# Patient Record
Sex: FEMALE | Race: WHITE | NOT HISPANIC OR LATINO | URBAN - METROPOLITAN AREA
[De-identification: names, ages, dates, MRNs, and addresses within clinical notes are randomized per-mention and may not be internally consistent; named-entity substitution may affect disease eponyms.]

---

## 2017-12-26 RX ORDER — NORETHINDRONE ACETATE AND ETHINYL ESTRADIOL AND FERROUS FUMARATE 1MG-20(24)
KIT ORAL
Qty: 28 TABLET | Refills: 10 | OUTPATIENT
Start: 2017-12-26

## 2017-12-29 RX ORDER — NORETHINDRONE ACETATE AND ETHINYL ESTRADIOL AND FERROUS FUMARATE 1MG-20(24)
KIT ORAL
Qty: 28 TABLET | Refills: 10 | OUTPATIENT
Start: 2017-12-29

## 2018-05-29 ENCOUNTER — OFFICE VISIT (OUTPATIENT)
Dept: OBSTETRICS AND GYNECOLOGY | Facility: CLINIC | Age: 23
End: 2018-05-29

## 2018-05-29 VITALS
BODY MASS INDEX: 25.27 KG/M2 | SYSTOLIC BLOOD PRESSURE: 110 MMHG | HEIGHT: 64 IN | DIASTOLIC BLOOD PRESSURE: 60 MMHG | WEIGHT: 148 LBS

## 2018-05-29 DIAGNOSIS — Z01.419 ENCOUNTER FOR WELL WOMAN EXAM WITH ROUTINE GYNECOLOGICAL EXAM: Primary | ICD-10-CM

## 2018-05-29 PROCEDURE — 99395 PREV VISIT EST AGE 18-39: CPT | Performed by: OBSTETRICS & GYNECOLOGY

## 2018-05-29 RX ORDER — NORETHINDRONE ACETATE AND ETHINYL ESTRADIOL AND FERROUS FUMARATE 1MG-20(24)
1 KIT ORAL DAILY
Qty: 28 TABLET | Refills: 12 | Status: SHIPPED | OUTPATIENT
Start: 2018-05-29 | End: 2019-05-29

## 2018-05-29 NOTE — PROGRESS NOTES
"Subjective   Chief Complaint   Patient presents with   • Annual Exam     Johanna Sawyer is a 23 y.o. year old  presenting to be seen for her annual exam.    Current birth control method: condoms. She has been off her birth control pills for couple months; i think she ran out and was overdue for appointment.She felt better on the birth control pills with lighter periods.  Also less fatigue and cramping.  She like to go back on these.  She had a question regarding a trip to [place] she is fluent [place].  She'll be there from September through December for school.  If her infant in [facility] has not okay at 3 month supply of Loestrin told her that they do self birth control pills over-the-counter in the pharmacias in [place].  She has questions regarding nutrition and weight.  Her BMI is 25.4.  Discussed diet and exercise.    Patient's last menstrual period was 2018.    She is sexually active.   Condoms ARE typically used.      Past 6 month menstrual history:    Cycle Frequency: regular, predictable and consistent every 28 - 32 days   Menstrual cycle character: flow is typically normal and flow is typically moderately heavy   Cycle Duration: 4 - 6   Number of heavy days of flows: 2   Intermenstrual bleeding present: {no   Post-coital bleeding present: no     She exercises regularly: yes.  Calcium intake: yes.  She performs self breast exam:not asked.  She has concerns about domestic violence: no.    The following portions of the patient's history were reviewed and updated as appropriate:problem list, current medications, allergies, past family history, past medical history, past social history and past surgical history.    Review of Systems    normal bladder and bowels  Objective   /60   Ht 162.6 cm (64\")   Wt 67.1 kg (148 lb)   LMP 2018   BMI 25.40 kg/m²      General:  well developed; well nourished  no acute distress  appears stated age   Skin:  No suspicious lesions seen   Thyroid:  "   Breasts:  Not performed.   Abdomen: soft, non-tender; no masses  no umbilical or inginual hernias are present  no hepato-splenomegaly   Pelvis: Clinical staff was present for exam  External genitalia:  normal appearance of the external genitalia including Bartholin's and [place]'s glands.  :  urethral meatus normal;  Vaginal:  normal pink mucosa without prolapse or lesions.  Cervix:  normal appearance. First Pap taken  Uterus:  normal size, shape and consistency. anteverted;  Adnexa:  normal bimanual exam of the adnexa.  Rectal:  digital rectal exam not performed; anus visually normal appearing.     Lab Review   No data reviewed    Imaging  No data reviewed       Assessment   1. Normal GYN examination.  Normal small nulliparous uterus anteflexed slightly no adnexal masses.  First Pap smear taken today we'll do STD screening as well.  Would encourage continued condom use even with reinstitution of birth control pills for birth control.     Plan   1. 1000 mg calcium in divided doses ideally in diet; regular exercise  2. As above      Medications Rx this encounter:  No orders of the defined types were placed in this encounter.         This note was electronically signed.    Jarrett Griffin MD  5/29/2018

## 2018-07-26 ENCOUNTER — TELEPHONE (OUTPATIENT)
Dept: OBSTETRICS AND GYNECOLOGY | Facility: CLINIC | Age: 23
End: 2018-07-26

## 2018-07-26 NOTE — TELEPHONE ENCOUNTER
Dr Gaby Sawyer the mother of pt states that daughter is in flight to [place] and left her birth control pills. Is there anything we can do.    Marie Sawyer phone [phone]

## 2018-08-31 ENCOUNTER — TELEPHONE (OUTPATIENT)
Dept: OBSTETRICS AND GYNECOLOGY | Facility: CLINIC | Age: 23
End: 2018-08-31

## 2018-08-31 RX ORDER — LEVONORGESTREL AND ETHINYL ESTRADIOL 0.15-0.03
1 KIT ORAL DAILY
Qty: 84 TABLET | Refills: 2 | Status: SHIPPED | OUTPATIENT
Start: 2018-08-31 | End: 2019-07-15 | Stop reason: SDUPTHER

## 2019-07-15 ENCOUNTER — OFFICE VISIT (OUTPATIENT)
Dept: OBSTETRICS AND GYNECOLOGY | Facility: CLINIC | Age: 24
End: 2019-07-15

## 2019-07-15 VITALS
WEIGHT: 154 LBS | DIASTOLIC BLOOD PRESSURE: 70 MMHG | HEIGHT: 65 IN | SYSTOLIC BLOOD PRESSURE: 116 MMHG | BODY MASS INDEX: 25.66 KG/M2

## 2019-07-15 DIAGNOSIS — Z01.419 ENCOUNTER FOR GYNECOLOGICAL EXAMINATION WITHOUT ABNORMAL FINDING: Primary | ICD-10-CM

## 2019-07-15 PROBLEM — Z78.9 STRICT VEGETARIAN DIET: Status: ACTIVE | Noted: 2019-07-15

## 2019-07-15 PROCEDURE — 99395 PREV VISIT EST AGE 18-39: CPT | Performed by: OBSTETRICS & GYNECOLOGY

## 2019-07-15 RX ORDER — LEVONORGESTREL AND ETHINYL ESTRADIOL 0.15-0.03
1 KIT ORAL DAILY
Qty: 84 TABLET | Refills: 3 | Status: SHIPPED | OUTPATIENT
Start: 2019-07-15 | End: 2020-05-26

## 2019-07-15 NOTE — PROGRESS NOTES
Subjective   Chief Complaint   Patient presents with   • Annual Exam     Johanna Sawyer is a 24 y.o. year old  presenting to be seen for her annual exam.    Current birth control method: OCP (estrogen/progesterone).  She had a question regarding IUDs.  Discussed that she would not want to use a copper IUD like her friend and throughout heavier cycles.  Being nulliparous I would suggest the Kyleena IUD if she chose to use that.  She does not have any real problems remembering to take her birth control pills.    Has some concerns for her mother who is obese and is at least prediabetic.  Talked her about more of a vegetarian type diet.  Discussed avoidance of carbohydrates which showed an excess in the American diet.    No LMP recorded (lmp unknown). Patient is not currently having periods (Reason: Oral contraceptives).    She is sexually active.   Condoms are not typically used.    [place] is painful or she is having problems :no  She has concerns about domestic violence: no.  She has received the Gardasil vaccination.    Cycle Frequency: regular every 90 days   Menstrual cycle character: flow is typically light   Cycle Duration: 2 - 3   Number of heavy days of flows: 0   Intermenstrual bleeding present: no   Post-coital bleeding present: no     She exercises regularly: yes.  Self breast awareness:not asked    Calcium intake: is adequate.3+   Caffeine intake: no or mild caffeine use  Social History    Tobacco Use      Smoking status: Never Smoker      Smokeless tobacco: Never Used    Social History     Substance and Sexual Activity   Alcohol Use Yes   • Frequency: 2-3 times a week   • Drinks per session: 1 or 2    Comment: social        The following portions of the patient's history were reviewed and updated as is  appropriate:She  has a past medical history of Asthma.  She does not have a problem list on file.  She  has a past surgical history that includes [place] tooth extraction.  Her family history  "is not on file.  She  reports that she has never smoked. She has never used smokeless tobacco. She reports that she drinks alcohol. She reports that she does not use drugs.  She has No Known Allergies..    Current Outpatient Medications:   •  Cetirizine HCl (ZYRTEC ALLERGY) 10 MG capsule, Zyrtec, Disp: , Rfl:   •  levonorgestrel-ethinyl estradiol (SEASONALE) 0.15-0.03 MG per tablet, Take 1 tablet by mouth Daily., Disp: 84 tablet, Rfl: 3    Review of Systems    normal bladder and bowels  Objective   /70   Ht 163.8 cm (64.5\")   Wt 69.9 kg (154 lb)   LMP  (LMP Unknown)   Breastfeeding? No   BMI 26.03 kg/m²       General:  well developed; well nourished  no acute distress  appears stated age   Skin:  No suspicious lesions seen   Thyroid:    Breasts:  Not performed.   Abdomen: soft, non-tender; no masses  no umbilical or inguinal hernias are present  no hepato-splenomegaly   Pelvis: Clinical staff was present for exam  External genitalia:  normal appearance of the external genitalia including Bartholin's and [place]'s glands.  :  urethral meatus normal;  Vaginal:  normal pink mucosa without prolapse or lesions.  Uterus:  normal size, shape and consistency. anteverted;  Adnexa:  normal bimanual exam of the adnexa.       Lab Review   Pap test    Imaging  No data reviewed       Assessment   1. Vegetarian  2. Normal gynecologic examination.  3. Status post Gardasil-I will ask my nurse to add this to her immunization profile               Plan     1. Continue birth control pills.  She can call if she would like some information regarding the Kyleena IUD  2. 1000 mg calcium in divided doses ideally in diet; regular exercise  3. Self breast awareness and mammogram protocols discussed.  4. Annual examination or sooner as needed         New Medications Ordered This Visit   Medications   • levonorgestrel-ethinyl estradiol (SEASONALE) 0.15-0.03 MG per tablet     Sig: Take 1 tablet by mouth Daily.     Dispense:  84 tablet "     Refill:  3     No orders of the defined types were placed in this encounter.          This note was electronically signed.    Jarrett Griffin MD  7/15/2019

## 2020-01-13 ENCOUNTER — TELEPHONE (OUTPATIENT)
Dept: OBSTETRICS AND GYNECOLOGY | Facility: CLINIC | Age: 25
End: 2020-01-13

## 2020-01-14 ENCOUNTER — TELEPHONE (OUTPATIENT)
Dept: OBSTETRICS AND GYNECOLOGY | Facility: CLINIC | Age: 25
End: 2020-01-14

## 2020-01-14 RX ORDER — NORETHINDRONE ACETATE AND ETHINYL ESTRADIOL AND FERROUS FUMARATE 1MG-20(24)
1 KIT ORAL DAILY
Qty: 28 TABLET | Refills: 6 | Status: SHIPPED | OUTPATIENT
Start: 2020-01-14 | End: 2020-05-26

## 2020-01-14 RX ORDER — NORETHINDRONE ACETATE AND ETHINYL ESTRADIOL AND FERROUS FUMARATE 1MG-20(24)
1 KIT ORAL DAILY
Qty: 90 TABLET | Refills: 1 | Status: SHIPPED | OUTPATIENT
Start: 2020-01-14 | End: 2020-04-13

## 2020-05-26 ENCOUNTER — OFFICE VISIT (OUTPATIENT)
Dept: OBSTETRICS AND GYNECOLOGY | Facility: CLINIC | Age: 25
End: 2020-05-26

## 2020-05-26 VITALS
BODY MASS INDEX: 23.66 KG/M2 | DIASTOLIC BLOOD PRESSURE: 70 MMHG | WEIGHT: 142 LBS | SYSTOLIC BLOOD PRESSURE: 116 MMHG | HEIGHT: 65 IN

## 2020-05-26 DIAGNOSIS — N76.0 ACUTE VAGINITIS: Primary | ICD-10-CM

## 2020-05-26 PROCEDURE — 83986 ASSAY PH BODY FLUID NOS: CPT | Performed by: OBSTETRICS & GYNECOLOGY

## 2020-05-26 PROCEDURE — 99213 OFFICE O/P EST LOW 20 MIN: CPT | Performed by: OBSTETRICS & GYNECOLOGY

## 2020-05-26 PROCEDURE — 87210 SMEAR WET MOUNT SALINE/INK: CPT | Performed by: OBSTETRICS & GYNECOLOGY

## 2020-05-26 RX ORDER — NORETHINDRONE ACETATE AND ETHINYL ESTRADIOL AND FERROUS FUMARATE 1MG-20(24)
KIT ORAL
COMMUNITY
End: 2020-07-16 | Stop reason: SDUPTHER

## 2020-05-26 RX ORDER — FLUCONAZOLE 150 MG/1
150 TABLET ORAL DAILY
Qty: 1 TABLET | Refills: 1 | Status: SHIPPED | OUTPATIENT
Start: 2020-05-26 | End: 2020-07-16

## 2020-05-26 NOTE — PROGRESS NOTES
Subjective   Chief Complaint   Patient presents with   • Annual Exam     Johanna Sawyer is a 24 y.o. year old  presenting to be seen for evaluation of an abnormal vaginal discharge.  Since about October she has been having what she says is little tears or maybe upon questioning seizures in the labia or and/or vagina.  Not really complain of a lot of vaginal discharge.  She is vegetarian no change in her diet no change in medications.  She thought it might get a little bit better when she switched birth control pills but she also admits that this could be just in her head.  Discussed that all birth control pills are low but progesterone dominant.  She do not have anything like this before.  Is more of a stinging kind of pain during intercourse.  Does not seem to get worse with urination.  Is been fairly constant does not come and go.  Does not get terribly worse    She is sexually active.  In the past 12 months there has been new sexual partners.  She is currently back together with her prior partner but sounds like there is another partner for short period of time.  Condoms ARE typically used.  She would like to be screened for STD's at today's exam.     Current birth control method: OCP (estrogen/progesterone)    Patient's last menstrual period was 2020.  Cycle Frequency: regular, predicable and consistent every 28 - 32 days   Menstrual cycle character: flow is typically normal   Cycle Duration: 3 - 5                 The following portions of the patient's history were reviewed and updated as appropriate:problem list, current medications and allergies    Current Outpatient Medications:   •  Cetirizine HCl (ZYRTEC ALLERGY) 10 MG capsule, Zyrtec, Disp: , Rfl:   •  norethindrone-ethinyl estradiol-ferrous fumarate (Yany 24 Fe) 1-20 MG-MCG(24) per tablet, Yany 24 Fe 1 mg-20 mcg (24)/75 mg (4) tablet, Disp: , Rfl:   •  fluconazole (Diflucan) 150 MG tablet, Take 1 tablet by mouth Daily. Take 1 tablet,  "Disp: 1 tablet, Rfl: 1     Review of Systems normal bowels and bladder no fevers no cough cold weight is been stable     Objective   /70   Ht 164.5 cm (64.75\")   Wt 64.4 kg (142 lb)   LMP 05/04/2020   Breastfeeding No   BMI 23.81 kg/m²     General:  well developed; well nourished  no acute distress  appears stated age   Skin:  No suspicious lesions seen   Abdomen: soft, non-tender; no masses  no umbilical or inguinal hernias are present  no hepato-splenomegaly   Pelvis: Clinical staff was present for exam  External genitalia:  normal appearance of the external genitalia including Bartholin's and [place]'s glands.  :  urethral meatus normal;  Vaginal:  normal pink mucosa without prolapse or lesions. discharge present -  white and thick; pH = 4.5 wet prep done: pseudo-hyphae are present; In the posterior fourchette there is a small area approximately 7 to 8 mm in diameter that is little more erythematous and is tender to touch  Cervix:  normal appearance. friable; Bled slightly when Q-tip used for STD screening  Uterus:  normal size, shape and consistency.  Adnexa:  normal bimanual exam of the adnexa.       Lab Review   Pap test and STD swabs for GC, chlamydia and trichimoniasis  Imaging   No data reviewed       Assessment   1. Yeast vaginitis likely cause of vaginal sensitivity/irritation  2. Problem cervix we will follow-up on STD screening     Plan   1. We will treat yeast vaginitis with Diflucan and over-the-counter Monistat.  2. Follow-up STD screening condoms for STD protection  3. Yearly in July; no change in birth control pills    No orders of the defined types were placed in this encounter.    New Medications Ordered This Visit   Medications   • fluconazole (Diflucan) 150 MG tablet     Sig: Take 1 tablet by mouth Daily. Take 1 tablet     Dispense:  1 tablet     Refill:  1         This note was electronically signed.    Jarrett Griffin MD  May 26, 2020    "

## 2020-05-27 ENCOUNTER — RESULTS ENCOUNTER (OUTPATIENT)
Dept: OBSTETRICS AND GYNECOLOGY | Facility: CLINIC | Age: 25
End: 2020-05-27

## 2020-05-27 DIAGNOSIS — N76.0 ACUTE VAGINITIS: ICD-10-CM

## 2020-06-22 ENCOUNTER — TELEPHONE (OUTPATIENT)
Dept: OBSTETRICS AND GYNECOLOGY | Facility: CLINIC | Age: 25
End: 2020-06-22

## 2020-07-16 ENCOUNTER — OFFICE VISIT (OUTPATIENT)
Dept: OBSTETRICS AND GYNECOLOGY | Facility: CLINIC | Age: 25
End: 2020-07-16

## 2020-07-16 VITALS
BODY MASS INDEX: 22.66 KG/M2 | HEIGHT: 65 IN | WEIGHT: 136 LBS | DIASTOLIC BLOOD PRESSURE: 70 MMHG | SYSTOLIC BLOOD PRESSURE: 114 MMHG

## 2020-07-16 DIAGNOSIS — R30.0 DYSURIA: ICD-10-CM

## 2020-07-16 DIAGNOSIS — Z01.419 ENCOUNTER FOR GYNECOLOGICAL EXAMINATION WITHOUT ABNORMAL FINDING: Primary | ICD-10-CM

## 2020-07-16 PROCEDURE — 99395 PREV VISIT EST AGE 18-39: CPT | Performed by: OBSTETRICS & GYNECOLOGY

## 2020-07-16 RX ORDER — NORETHINDRONE ACETATE AND ETHINYL ESTRADIOL AND FERROUS FUMARATE 1MG-20(24)
1 KIT ORAL DAILY
Qty: 28 TABLET | Refills: 12 | Status: SHIPPED | OUTPATIENT
Start: 2020-07-16 | End: 2021-08-02 | Stop reason: SDUPTHER

## 2020-07-16 RX ORDER — SULFAMETHOXAZOLE AND TRIMETHOPRIM 800; 160 MG/1; MG/1
TABLET ORAL
COMMUNITY
Start: 2020-07-12 | End: 2021-08-09

## 2020-07-16 NOTE — PROGRESS NOTES
Subjective   Chief Complaint   Patient presents with   • Annual Exam     Johanna Sawyer is a 25 y.o. year old  presenting to be seen for her annual exam.    Current birth control method: OCP (estrogen/progesterone).  She would like to continue her pills.  She just called in on the 12th elsewhere for Bactrim for bladder infection all day 4 of the pills out of 10 days feels much better.    Patient's last menstrual period was 2020.    She is sexually active.   Condoms are not typically used.    [place] is painful or she is having problems :no  She has concerns about domestic violence: no.    Cycle Frequency: regular, predictable and consistent every 28 - 32 days   Menstrual cycle character: flow is typically light and flow is typically normal   Cycle Duration: 3 - 4   Number of heavy days of flows: 0   Intermenstrual bleeding present: yes switched pills   Post-coital bleeding present: no     She exercises regularly: yes.  Self breast awareness:not asked    Calcium intake: is adequate.3  Caffeine intake: no or mild caffeine use  Social History    Tobacco Use      Smoking status: Never Smoker      Smokeless tobacco: Never Used    Social History     Substance and Sexual Activity   Alcohol Use Yes   • Frequency: 2-3 times a week   • Drinks per session: 1 or 2    Comment: social        The following portions of the patient's history were reviewed and updated as is  appropriate:problem list, current medications, allergies, past family history, past medical history, past social history and past surgical history.    Current Outpatient Medications:   •  Cetirizine HCl (ZYRTEC ALLERGY) 10 MG capsule, Zyrtec, Disp: , Rfl:   •  norethindrone-ethinyl estradiol-ferrous fumarate (Yany 24 Fe) 1-20 MG-MCG(24) per tablet, Take 1 tablet by mouth Daily., Disp: 28 tablet, Rfl: 12  •  sulfamethoxazole-trimethoprim (BACTRIM DS,SEPTRA DS) 800-160 MG per tablet, , Disp: , Rfl:     Review of systems  Constitutional    POS  "nothing reported                            NEG anorexia, fevers, malaise or night sweats  Breast                POS nothing reported                            NEG persistent breast lump, skin dimpling, breast tenderness or nipple discharge  GI                      POS nothing reported                            NEG bloating, change in bowel habits, melena or reflux symptoms                       POS nothing reported                            NEG dysuria or hematuria       Objective   /70   Ht 163.8 cm (64.5\")   Wt 61.7 kg (136 lb)   LMP 06/28/2020   Breastfeeding No   BMI 22.98 kg/m²       General:  well developed; well nourished  no acute distress  appears stated age   Skin:  No suspicious lesions seen   Thyroid:    Breasts:  Not performed.   Abdomen: soft, non-tender; no masses  no umbilical or inguinal hernias are present  no hepato-splenomegaly   Pelvis: Clinical staff was present for exam  External genitalia:  normal appearance of the external genitalia including Bartholin's and [place]'s glands.  :  urethral meatus normal; Bladder is nontender  Vaginal:  normal pink mucosa without prolapse or lesions.  Uterus:  normal size, shape and consistency.  Adnexa:  normal bimanual exam of the adnexa.  Rectal:  digital rectal exam not performed; anus visually normal appearing.       Lab Review   Pap test and STD screening    Imaging  No data reviewed       Assessment     1. Normal GYN examination doing well on birth control pills  2. Possible recent UTI improved on Bactrim  3. Strict vegan diet  4.              Plan     1. Annual examination or sooner as needed  2. 1000 mg calcium in divided doses ideally in diet; regular exercise  3. Condoms for STD protection  4.                New Medications Ordered This Visit   Medications   • norethindrone-ethinyl estradiol-ferrous fumarate (Yany 24 Fe) 1-20 MG-MCG(24) per tablet     Sig: Take 1 tablet by mouth Daily.     Dispense:  28 tablet     Refill:  12 "     No orders of the defined types were placed in this encounter.          This note was electronically signed.    Jarrett Griffni MD  7/16/2020

## 2020-12-31 ENCOUNTER — TELEPHONE (OUTPATIENT)
Dept: OBSTETRICS AND GYNECOLOGY | Facility: CLINIC | Age: 25
End: 2020-12-31

## 2020-12-31 NOTE — TELEPHONE ENCOUNTER
PT STATES SECOND WEEK OF BIRTH CONTROL AND IS BLEEDING - DID TAKE ANTIBIOTIC W/BACK UP BIRTH CONTROL - COULD THIS BE THE CAUSE

## 2020-12-31 NOTE — TELEPHONE ENCOUNTER
It could be the antibiotics it could be associate with generic birth control pills.  I would stay on these if this is the first time she has had any problems since we saw her in July.  If this persists next month we will switch her birth control pills.  Thank you

## 2021-08-02 ENCOUNTER — TELEPHONE (OUTPATIENT)
Dept: OBSTETRICS AND GYNECOLOGY | Facility: CLINIC | Age: 26
End: 2021-08-02

## 2021-08-02 RX ORDER — NORETHINDRONE ACETATE AND ETHINYL ESTRADIOL, AND FERROUS FUMARATE 1MG-20(24)
1 KIT ORAL DAILY
Qty: 28 TABLET | Refills: 0 | Status: SHIPPED | OUTPATIENT
Start: 2021-08-02 | End: 2021-08-09 | Stop reason: ALTCHOICE

## 2021-08-02 NOTE — TELEPHONE ENCOUNTER
Patient called and wanted to know if a refill for her birth control could be sent to her pharmacy.  She has an appointment scheduled for 8/9/21 with Whitney.

## 2021-08-02 NOTE — TELEPHONE ENCOUNTER
Patient called and was wanting to know if we were able to send in the refill for her birth control.  I advised her that the refill had been sent.  Patient verbalized understanding and she had no questions at this time.

## 2021-08-09 ENCOUNTER — OFFICE VISIT (OUTPATIENT)
Dept: OBSTETRICS AND GYNECOLOGY | Facility: CLINIC | Age: 26
End: 2021-08-09

## 2021-08-09 VITALS
BODY MASS INDEX: 24 KG/M2 | WEIGHT: 142 LBS | SYSTOLIC BLOOD PRESSURE: 110 MMHG | DIASTOLIC BLOOD PRESSURE: 70 MMHG | RESPIRATION RATE: 16 BRPM

## 2021-08-09 DIAGNOSIS — Z01.419 ENCOUNTER FOR WELL WOMAN EXAM WITH ROUTINE GYNECOLOGICAL EXAM: Primary | ICD-10-CM

## 2021-08-09 DIAGNOSIS — Z30.41 ENCOUNTER FOR SURVEILLANCE OF CONTRACEPTIVE PILLS: ICD-10-CM

## 2021-08-09 PROCEDURE — 99395 PREV VISIT EST AGE 18-39: CPT | Performed by: NURSE PRACTITIONER

## 2021-08-09 RX ORDER — AMOXICILLIN 875 MG/1
TABLET, COATED ORAL
COMMUNITY
Start: 2021-08-02 | End: 2021-12-07

## 2021-08-09 RX ORDER — NEOMYCIN SULFATE, POLYMYXIN B SULFATE, HYDROCORTISONE 3.5; 10000; 1 MG/ML; [USP'U]/ML; MG/ML
SOLUTION/ DROPS AURICULAR (OTIC)
COMMUNITY
Start: 2021-08-02 | End: 2021-12-07

## 2021-08-09 NOTE — PROGRESS NOTES
Annual Visit     Patient Name: Johanna Sawyer  : 1995   MRN: [id number]   Care Team: Patient Care Team:  Provider, No Known as PCP - General    Chief Complaint:    Chief Complaint   Patient presents with   • Annual Exam       HPI: Johanna Sawyer is a 26 y.o. year old  presenting to be seen for her gynecologic exam.   Doing well with Yany 24 Fe but would like to discuss other options that allow amenorrhea   Light and short periods currently - avg 3 days   No BTB with current pill   Sexually active - requests STD screening today - no s/sx just wants to be sure   Pap smear  WNL, no tzone present   She is a          Subjective      /70   Resp 16   Wt 64.4 kg (142 lb)   LMP 2021   Breastfeeding No   BMI 24.00 kg/m²     BMI reviewed: Body mass index is 24 kg/m².      Objective     Physical Exam    Neuro: alert and oriented to person, place and time   General:  alert; cooperative; well developed; well nourished   Skin:  No suspicious lesions seen   Thyroid: normal to inspection and palpation   Lungs:  breathing is unlabored  clear to auscultation bilaterally   Heart:  regular rate and rhythm, S1, S2 normal, no murmur, click, rub or gallop  normal apical impulse   Breasts:  Examined in supine position  Symmetric without masses or skin dimpling  Nipples normal without inversion, lesions or discharge  There are no palpable axillary nodes  Fibrocystic changes are present both breasts without a discrete mass   Abdomen: soft, non-tender; no masses  no umbilical or inguinal hernias are present  no hepato-splenomegaly   Pelvis: Clinical staff was present for exam  External genitalia:  normal appearance of the external genitalia including Bartholin's and [place]'s glands.  :  urethral meatus normal;  Vaginal:  normal pink mucosa without prolapse or lesions.  Cervix:  normal appearance.  Uterus:  normal size, shape and consistency.  Adnexa:  normal bimanual exam of the  adnexa.  Rectal:  digital rectal exam not performed; anus visually normal appearing.         Assessment / Plan      Assessment  Problems Addressed This Visit    ICD-10-CM ICD-9-CM   1. Encounter for well woman exam with routine gynecological exam  Z01.419 V72.31   2. Encounter for surveillance of contraceptive pills  Z30.41 V25.41       Plan    Pap smear pending   Discussed monthly SBEs   Discussed Lo loestrin and rate of amenorrhea with method - samples x 2 given   She will finish current pill pkg, then start sample and call before she completes the pkg to f/u - we can cont with lo loestrin or if she has BTB, go back to [place] 24 fe and do continuous dosing   No C/Is to cont COCs         19 to 39: Counseling/Anticipatory Guidance Discussed: family planning/contraception, sexual behavior and STDs and breast cancer and self breast exams    Follow Up  Return in about 1 year (around 8/9/2022) for Annual physical.  Patient was given instructions and counseling regarding her condition or for health maintenance advice. Please see specific information pulled into the AVS if appropriate.     Whitney Delgadillo, APRN  August 9, 2021  15:32 EDT

## 2021-10-19 ENCOUNTER — TELEPHONE (OUTPATIENT)
Dept: OBSTETRICS AND GYNECOLOGY | Facility: CLINIC | Age: 26
End: 2021-10-19

## 2021-10-19 NOTE — TELEPHONE ENCOUNTER
Pt is calling [facility] is calling to see if we can call a prescription in of this

## 2021-12-07 PROCEDURE — U0004 COV-19 TEST NON-CDC HGH THRU: HCPCS | Performed by: NURSE PRACTITIONER

## 2021-12-07 PROCEDURE — 87081 CULTURE SCREEN ONLY: CPT | Performed by: NURSE PRACTITIONER

## 2022-01-11 ENCOUNTER — TELEPHONE (OUTPATIENT)
Dept: OBSTETRICS AND GYNECOLOGY | Facility: CLINIC | Age: 27
End: 2022-01-11

## 2022-01-11 RX ORDER — NORETHINDRONE ACETATE AND ETHINYL ESTRADIOL AND FERROUS FUMARATE 1MG-20(24)
1 KIT ORAL DAILY
Qty: 84 TABLET | Refills: 5 | OUTPATIENT
Start: 2022-01-11 | End: 2022-08-29

## 2022-11-11 ENCOUNTER — OFFICE VISIT (OUTPATIENT)
Dept: OBSTETRICS AND GYNECOLOGY | Facility: CLINIC | Age: 27
End: 2022-11-11

## 2022-11-11 VITALS
DIASTOLIC BLOOD PRESSURE: 70 MMHG | BODY MASS INDEX: 24.79 KG/M2 | RESPIRATION RATE: 16 BRPM | WEIGHT: 149 LBS | SYSTOLIC BLOOD PRESSURE: 114 MMHG

## 2022-11-11 DIAGNOSIS — Z30.41 ENCOUNTER FOR SURVEILLANCE OF CONTRACEPTIVE PILLS: ICD-10-CM

## 2022-11-11 DIAGNOSIS — Z01.419 ENCOUNTER FOR GYNECOLOGICAL EXAMINATION WITHOUT ABNORMAL FINDING: Primary | ICD-10-CM

## 2022-11-11 PROCEDURE — 99395 PREV VISIT EST AGE 18-39: CPT | Performed by: NURSE PRACTITIONER

## 2022-11-11 RX ORDER — NORETHINDRONE ACETATE AND ETHINYL ESTRADIOL, AND FERROUS FUMARATE 1MG-20(24)
1 KIT ORAL DAILY
Qty: 84 TABLET | Refills: 4 | Status: SHIPPED | OUTPATIENT
Start: 2022-11-11 | End: 2023-03-20 | Stop reason: SDUPTHER

## 2022-11-11 RX ORDER — NORETHINDRONE ACETATE AND ETHINYL ESTRADIOL, AND FERROUS FUMARATE 1MG-20(24)
KIT ORAL
COMMUNITY
Start: 2022-10-19 | End: 2022-11-11 | Stop reason: SDUPTHER

## 2022-11-11 NOTE — PROGRESS NOTES
Annual Visit     Patient Name: Johanna Sawyer  : 1995   MRN: [id number]   Care Team: Patient Care Team:  Provider, No Known as PCP - General    Chief Complaint:    Chief Complaint   Patient presents with   • Annual Exam       HPI: Johanna Sawyer is a 27 y.o. year old  presenting to be seen for her gynecologic exam.   Doing well with Yany 24 Fe  Tried lo loestrin last yr to achieve amenorrhea but d/t cost, changed back to Yany 24 Fe   Taking continuously and having withdrawal bldg q 3 months - light and short flow, no BTB     Pap smear 2021 WNL     She is a        Subjective      I have reviewed the patients family history, social history, past medical history, past surgical history and have updated it as appropriate.    /70   Resp 16   Wt 67.6 kg (149 lb)   LMP 10/13/2022   BMI 24.79 kg/m²     BMI reviewed: Body mass index is 24.79 kg/m².      Objective     Physical Exam    Neuro: alert and oriented to person, place and time   General:  alert; cooperative; well developed; well nourished   Skin:  No suspicious lesions seen   Thyroid: normal to inspection and palpation   Lungs:  breathing is unlabored  clear to auscultation bilaterally   Heart:  regular rate and rhythm, S1, S2 normal, no murmur, click, rub or gallop  normal apical impulse   Breasts:  Examined in supine position  Symmetric without masses or skin dimpling  Nipples normal without inversion, lesions or discharge  There are no palpable axillary nodes  Fibrocystic changes are present both breasts without a discrete mass   Abdomen: soft, non-tender; no masses  no umbilical or inguinal hernias are present  no hepato-splenomegaly   Pelvis: Clinical staff was present for exam  External genitalia:  normal appearance of the external genitalia including Bartholin's and [place]'s glands.  :  urethral meatus normal;  Vaginal:  normal pink mucosa without prolapse or lesions.  Cervix:  normal appearance.  Uterus:   normal size, shape and consistency.  Adnexa:  normal bimanual exam of the adnexa.  Rectal:  digital rectal exam not performed; anus visually normal appearing.         Assessment / Plan      Assessment  Problems Addressed This Visit    ICD-10-CM ICD-9-CM   1. Encounter for gynecological examination without abnormal finding  Z01.419 V72.31   2. Encounter for surveillance of contraceptive pills  Z30.41 V25.41       Plan    Pap smear not indicated     Discussed monthly SBEs and fibrocystic breast changes     No C/Is to cont COCs   Will cont continuously with withdrawal bldg q 3 months   Yany Fe 24 refills to pharmacy   Discussed condom use for STI prevention     AV 1 yr         19 to 39: Counseling/Anticipatory Guidance Discussed: family planning/contraception, sexual behavior and STDs and breast cancer and self breast exams    Follow Up  Return in about 1 year (around 11/11/2023) for Annual physical.  Patient was given instructions and counseling regarding her condition or for health maintenance advice. Please see specific information pulled into the AVS if appropriate.     Whitney Delgadillo, SANDEE  November 11, 2022  08:31 EST

## 2023-03-20 ENCOUNTER — TELEPHONE (OUTPATIENT)
Dept: OBSTETRICS AND GYNECOLOGY | Facility: CLINIC | Age: 28
End: 2023-03-20
Payer: COMMERCIAL

## 2023-03-20 RX ORDER — NORETHINDRONE ACETATE AND ETHINYL ESTRADIOL, AND FERROUS FUMARATE 1MG-20(24)
1 KIT ORAL DAILY
Qty: 84 TABLET | Refills: 2 | Status: SHIPPED | OUTPATIENT
Start: 2023-03-20

## 2023-03-20 NOTE — TELEPHONE ENCOUNTER
BC called in to hold her over to next due annual, Called attempting to reach patient to inform her of this; no answer, LVM requesting call back and provided office call back number.    HUB/:  Okay to inform her that Rx was sent, please update here and reroute to clinical pool to inform us of update if you let her know, thank you.

## 2023-03-20 NOTE — TELEPHONE ENCOUNTER
Caller: Johanna Sawyer    Relationship: Self    Best call back number: [phone]  What medications are you currently taking:   Current Outpatient Medications on File Prior to Visit   Medication Sig Dispense Refill   • Cetirizine HCl 10 MG capsule Zyrtec     • Yany 24 Fe 1-20 MG-MCG(24) per tablet Take 1 tablet by mouth Daily. To be taken continuously. 84 tablet 4   • [DISCONTINUED] fluticasone (FLONASE) 50 MCG/ACT nasal spray 2 sprays into the nostril(s) as directed by provider Every Night for 30 days. Administer 2 sprays in each nostril for each dose. 18.2 mL 0     No current facility-administered medications on file prior to visit.          When did you start taking these medications: NA    Which medication are you concerned about: Yany 24 Fe 1-20 MG-MCG(24) per tablet [[id number]] (Order [id number])        Who prescribed you this medication: SANDEE VALENZUELA     What are your concerns: SHE HAS CONCERNS, SHE IS OUT REFILLS AND THE DAYS ARE NOW OFF TRACK   How long have you had these concerns: NA    OUT OF THIS MEDICATION, REQUESTING REFILLS AS WELL, OK TO CALLBACK ANYTIME, OK TO LEAVE A .         PHARMACY CONFIRMED

## 2023-06-12 ENCOUNTER — TELEMEDICINE (OUTPATIENT)
Dept: FAMILY MEDICINE CLINIC | Facility: TELEHEALTH | Age: 28
End: 2023-06-12

## 2023-06-12 DIAGNOSIS — J30.2 SEASONAL ALLERGIES: ICD-10-CM

## 2023-06-12 DIAGNOSIS — R09.81 NASAL SINUS CONGESTION: Primary | ICD-10-CM

## 2023-06-12 RX ORDER — AZELASTINE 1 MG/ML
2 SPRAY, METERED NASAL 2 TIMES DAILY
Qty: 30 ML | Refills: 0 | Status: SHIPPED | OUTPATIENT
Start: 2023-06-12

## 2023-06-12 RX ORDER — PSEUDOEPHEDRINE HCL 120 MG/1
120 TABLET, FILM COATED, EXTENDED RELEASE ORAL EVERY 12 HOURS
Qty: 20 TABLET | Refills: 0 | Status: SHIPPED | OUTPATIENT
Start: 2023-06-12

## 2023-06-12 NOTE — PROGRESS NOTES
You have chosen to receive care through a telehealth visit.  Do you consent to use a video/audio connection for your medical care today? Yes     HPI  Johanna Sawyer is a 28 y.o. female  presents with complaint of 4 day h/o nasal congestion, sinus pressure and tenderness going into the teeth area and yellow nasal discharge. She is using OTC cold and sinus medication, flonase and Benadryl at hs. She reports no fever.     Review of Systems   Constitutional:  Negative for activity change, fatigue and fever.   HENT:  Positive for congestion, rhinorrhea, sinus pressure and sinus pain.    Respiratory: Negative.     Cardiovascular: Negative.    Gastrointestinal: Negative.    Allergic/Immunologic: Positive for environmental allergies.   Neurological: Negative.    Hematological: Negative.    Psychiatric/Behavioral: Negative.       History reviewed. No pertinent past medical history.    Family History   Problem Relation Age of Onset    Hypothyroidism Mother     Obesity Mother         Apple shaped     Diabetes type II Maternal Grandmother        Social History     Socioeconomic History    Marital status: Single   Tobacco Use    Smoking status: Never    Smokeless tobacco: Never   Vaping Use    Vaping Use: Never used   Substance and Sexual Activity    Alcohol use: Yes     Comment: social    Drug use: No    Sexual activity: Yes     Partners: Male     Birth control/protection: OCP         There were no vitals taken for this visit.    PHYSICAL EXAM  Physical Exam   Constitutional: She appears well-developed and well-nourished. She does not have a sickly appearance. She does not appear ill. No distress.   HENT:   Head: Normocephalic and atraumatic.   Nose: Rhinorrhea and congestion present. Right sinus exhibits maxillary sinus tenderness and frontal sinus tenderness. Left sinus exhibits maxillary sinus tenderness and frontal sinus tenderness.   Pulmonary/Chest: Effort normal.   Neurological: She is alert.   Psychiatric: She has a  normal mood and affect.   Vitals reviewed.    Diagnoses and all orders for this visit:    1. Nasal sinus congestion (Primary)  -     pseudoephedrine (Sudafed 12 Hour) 120 MG 12 hr tablet; Take 1 tablet by mouth Every 12 (Twelve) Hours.  Dispense: 20 tablet; Refill: 0  -     Ambulatory Referral to Family Practice    2. Seasonal allergies  -     azelastine (ASTELIN) 0.1 % nasal spray; 2 sprays into the nostril(s) as directed by provider 2 (Two) Times a Day. Use in each nostril as directed  Dispense: 30 mL; Refill: 0    Supporitive treatment with increase fluids and rest.   Continue Zyrtec and add Astelin nasal spray.   If no improvement follow up with PCP/virtual care.   PCP referral pending.       FOLLOW-UP  As discussed during visit with Virtual Care, if symptoms worsen or fail to improve, follow-up with PCP/Urgent Care/Emergency Department.    Patient verbalizes understanding of medications, instructions for treatment and follow-up.    Susi Lopez, SANDEE  06/12/2023  10:04 EDT    The use of a video visit has been reviewed with the patient and verbal informed consent has been obtained. Myself and Johanna Sawyer participated in this visit. The patient is located in  [facility], and I am located in [place], [place]. MyChart and Zoom were utilized.

## 2023-08-13 ENCOUNTER — TELEMEDICINE (OUTPATIENT)
Dept: FAMILY MEDICINE CLINIC | Facility: TELEHEALTH | Age: 28
End: 2023-08-13

## 2023-08-13 DIAGNOSIS — R39.89 SUSPECTED UTI: Primary | ICD-10-CM

## 2023-08-13 RX ORDER — NITROFURANTOIN 25; 75 MG/1; MG/1
100 CAPSULE ORAL 2 TIMES DAILY
Qty: 14 CAPSULE | Refills: 0 | Status: SHIPPED | OUTPATIENT
Start: 2023-08-13 | End: 2023-08-20

## 2023-08-13 RX ORDER — PHENAZOPYRIDINE HYDROCHLORIDE 200 MG/1
200 TABLET, FILM COATED ORAL 3 TIMES DAILY PRN
Qty: 6 TABLET | Refills: 0 | Status: SHIPPED | OUTPATIENT
Start: 2023-08-13 | End: 2023-08-15

## 2023-08-13 RX ORDER — FLUTICASONE PROPIONATE 50 MCG
SPRAY, SUSPENSION (ML) NASAL
COMMUNITY

## 2023-08-13 NOTE — PATIENT INSTRUCTIONS
Urinary Tract Infection, Adult    A urinary tract infection (UTI) is an infection of any part of the urinary tract. The urinary tract includes the kidneys, ureters, bladder, and urethra. These organs make, store, and get rid of urine in the body.  An upper UTI affects the ureters and kidneys. A lower UTI affects the bladder and urethra.  What are the causes?  Most urinary tract infections are caused by bacteria in your genital area around your urethra, where urine leaves your body. These bacteria grow and cause inflammation of your urinary tract.  What increases the risk?  You are more likely to develop this condition if:  You have a urinary catheter that stays in place.  You are not able to control when you urinate or have a bowel movement (incontinence).  You are female and you:  Use a spermicide or diaphragm for birth control.  Have low estrogen levels.  Are pregnant.  You have certain genes that increase your risk.  You are sexually active.  You take antibiotic medicines.  You have a condition that causes your flow of urine to slow down, such as:  An enlarged prostate, if you are male.  Blockage in your urethra.  A kidney stone.  A nerve condition that affects your bladder control (neurogenic bladder).  Not getting enough to drink, or not urinating often.  You have certain medical conditions, such as:  Diabetes.  A weak disease-fighting system (immunesystem).  Sickle cell disease.  Gout.  Spinal cord injury.  What are the signs or symptoms?  Symptoms of this condition include:  Needing to urinate right away (urgency).  Frequent urination. This may include small amounts of urine each time you urinate.  Pain or burning with urination.  Blood in the urine.  Urine that smells bad or unusual.  Trouble urinating.  Cloudy urine.  Vaginal discharge, if you are female.  Pain in the abdomen or the lower back.  You may also have:  Vomiting or a decreased appetite.  Confusion.  Irritability or tiredness.  A fever or  chills.  Diarrhea.  The first symptom in older adults may be confusion. In some cases, they may not have any symptoms until the infection has worsened.  How is this diagnosed?  This condition is diagnosed based on your medical history and a physical exam. You may also have other tests, including:  Urine tests.  Blood tests.  Tests for STIs (sexually transmitted infections).  If you have had more than one UTI, a cystoscopy or imaging studies may be done to determine the cause of the infections.  How is this treated?  Treatment for this condition includes:  Antibiotic medicine.  Over-the-counter medicines to treat discomfort.  Drinking enough water to stay hydrated.  If you have frequent infections or have other conditions such as a kidney stone, you may need to see a health care provider who specializes in the urinary tract (urologist).  In rare cases, urinary tract infections can cause sepsis. Sepsis is a life-threatening condition that occurs when the body responds to an infection. Sepsis is treated in the hospital with IV antibiotics, fluids, and other medicines.  Follow these instructions at home:    Medicines  Take over-the-counter and prescription medicines only as told by your health care provider.  If you were prescribed an antibiotic medicine, take it as told by your health care provider. Do not stop using the antibiotic even if you start to feel better.  General instructions  Make sure you:  Empty your bladder often and completely. Do not hold urine for long periods of time.  Empty your bladder after sex.  Wipe from front to back after urinating or having a bowel movement if you are female. Use each tissue only one time when you wipe.  Drink enough fluid to keep your urine pale yellow.  Keep all follow-up visits. This is important.  Contact a health care provider if:  Your symptoms do not get better after 1-2 days.  Your symptoms go away and then return.  Get help right away if:  You have severe pain in  your back or your lower abdomen.  You have a fever or chills.  You have nausea or vomiting.  Summary  A urinary tract infection (UTI) is an infection of any part of the urinary tract, which includes the kidneys, ureters, bladder, and urethra.  Most urinary tract infections are caused by bacteria in your genital area.  Treatment for this condition often includes antibiotic medicines.  If you were prescribed an antibiotic medicine, take it as told by your health care provider. Do not stop using the antibiotic even if you start to feel better.  Keep all follow-up visits. This is important.  This information is not intended to replace advice given to you by your health care provider. Make sure you discuss any questions you have with your health care provider.  Document Revised: 07/30/2021 Document Reviewed: 07/30/2021  [employer] Patient Education c 2023 [employer]

## 2023-08-13 NOTE — PROGRESS NOTES
You have chosen to receive care through a telehealth visit.  Do you consent to use a video/audio connection for your medical care today? Yes     CHIEF COMPLAINT  No chief complaint on file.        HPI  Johanna Sawyer is a 28 y.o. female  presents with complaint of woke up this morning with dysuria, frequency, urgency, bladder discomfort, small amt of blood in urine.  Denies fever/chills, n/v, back pain, or vaginal symptoms at this time.     Review of Systems  See HPI    No past medical history on file.    Family History   Problem Relation Age of Onset    Hypothyroidism Mother     Obesity Mother         Apple shaped     Diabetes type II Maternal Grandmother        Social History     Socioeconomic History    Marital status: Single   Tobacco Use    Smoking status: Never    Smokeless tobacco: Never   Vaping Use    Vaping Use: Never used   Substance and Sexual Activity    Alcohol use: Yes     Comment: social    Drug use: No    Sexual activity: Yes     Partners: Male     Birth control/protection: OCP       Johanna Sawyer  reports that she has never smoked. She has never used smokeless tobacco..               There were no vitals taken for this visit.    PHYSICAL EXAM  Physical Exam   Constitutional: She is oriented to person, place, and time. She appears well-developed and well-nourished. She does not have a sickly appearance. She does not appear ill.   HENT:   Head: Normocephalic and atraumatic.   Pulmonary/Chest: Effort normal.  No respiratory distress.  Neurological: She is alert and oriented to person, place, and time.         Diagnoses and all orders for this visit:    1. Suspected UTI (Primary)  -     nitrofurantoin, macrocrystal-monohydrate, (MACROBID) 100 MG capsule; Take 1 capsule by mouth 2 (Two) Times a Day for 7 days.  Dispense: 14 capsule; Refill: 0  -     phenazopyridine (PYRIDIUM) 200 MG tablet; Take 1 tablet by mouth 3 (Three) Times a Day As Needed for Bladder Spasms for up to 2 days.  Dispense: 6  tablet; Refill: 0    --take medications as prescribed  --increase fluids, rest as needed, tylenol or ibuprofen for pain  --f/u in 2-3 days if no improvement        FOLLOW-UP  As discussed during visit with PCP/[facility] Care if no improvement or Urgent Care/Emergency Department if worsening of symptoms    Patient verbalizes understanding of medication dosage, comfort measures, instructions for treatment and follow-up.    Sofy Grimes, APRN  08/13/2023  17:50 EDT    The use of a video visit has been reviewed with the patient and verbal informed consent has been obtained. Myself and Johanna Sawyer participated in this visit. The patient is located in [address] [place] [address].    I am located in [place], [place]. Playtox and Fastr were utilized. I spent 8 minutes in the patient's chart for this visit.

## 2023-11-22 ENCOUNTER — TELEMEDICINE (OUTPATIENT)
Dept: FAMILY MEDICINE CLINIC | Facility: TELEHEALTH | Age: 28
End: 2023-11-22

## 2023-11-22 DIAGNOSIS — H60.392 OTHER INFECTIVE ACUTE OTITIS EXTERNA OF LEFT EAR: Primary | ICD-10-CM

## 2023-11-22 PROCEDURE — 99213 OFFICE O/P EST LOW 20 MIN: CPT | Performed by: NURSE PRACTITIONER

## 2023-11-22 NOTE — PATIENT INSTRUCTIONS
Over the counter pain relievers okay   If symptoms do not improve in 3-5 days follow up with your primary care provider or urgent care         Otitis Externa  Otitis externa is an infection of the outer ear canal. The outer ear canal is the area between the outside of the ear and the eardrum. Otitis externa is sometimes called swimmer's ear.  What are the causes?  Common causes of this condition include:  Swimming in dirty water.  Moisture in the ear.  An injury to the inside of the ear.  An object stuck in the ear.  A cut or scrape on the outside of the ear or in the ear canal.  What increases the risk?  You are more likely to get this condition if you go swimming often.  What are the signs or symptoms?  Itching in the ear. This is often the first symptom.  Swelling of the ear.  Redness in the ear.  Ear pain. The pain may get worse when you pull on your ear.  Pus coming from the ear.  How is this treated?  This condition may be treated with:  Antibiotic ear drops. These are often given for 10-14 days.  Medicines to reduce itching and swelling.  Follow these instructions at home:  If you were prescribed antibiotic ear drops, use them as told by your doctor. Do not stop using them even if you start to feel better.  Take over-the-counter and prescription medicines only as told by your doctor.  Avoid getting water in your ears as told by your doctor. You may be told to avoid swimming or water sports for a few days.  Keep all follow-up visits.  How is this prevented?  Keep your ears dry. Use the corner of a towel to dry your ears after you swim or bathe.  Try not to scratch or put things in your ear. Doing these things makes it easier for germs to grow in your ear.  Avoid swimming in lakes, dirty water, or swimming pools that may not have the right amount of a chemical called chlorine.  Contact a doctor if:  You have a fever.  Your ear is still red, swollen, or painful after 3 days.  You still have pus coming from your  ear after 3 days.  Your redness, swelling, or pain gets worse.  You have a very bad headache.  Get help right away if:  You have redness, swelling, and pain or tenderness behind your ear.  Summary  Otitis externa is an infection of the outer ear canal.  Symptoms include pain, redness, and swelling of the ear.  If you were prescribed antibiotic ear drops, use them as told by your doctor. Do not stop using them even if you start to feel better.  Try not to scratch or put things in your ear.  This information is not intended to replace advice given to you by your health care provider. Make sure you discuss any questions you have with your health care provider.  Document Revised: 03/02/2022 Document Reviewed: 03/02/2022  Elsevier Patient Education © 2023 [employer]

## 2023-11-22 NOTE — PROGRESS NOTES
CHIEF COMPLAINT  Chief Complaint   Patient presents with    Earache         HPI  Johanna Sawyer is a 28 y.o. female  presents with complaint of outer left ear infection for several days. She did use her fingernail and scratch the inside of her ear and now the canal is irritated and the ear is sore to movement. She has had this before.     Review of Systems   Constitutional:  Negative for chills, diaphoresis, fatigue and fever.   HENT:  Positive for ear pain (other left ear.). Negative for congestion.        No past medical history on file.    Family History   Problem Relation Age of Onset    Hypothyroidism Mother     Obesity Mother         Apple shaped     Diabetes type II Maternal Grandmother        Social History     Socioeconomic History    Marital status: Single   Tobacco Use    Smoking status: Never     Passive exposure: Never    Smokeless tobacco: Never   Vaping Use    Vaping Use: Never used   Substance and Sexual Activity    Alcohol use: Yes     Comment: social    Drug use: No    Sexual activity: Yes     Partners: Male     Birth control/protection: OCP       Johanna Sawyer  reports that she has never smoked. She has never been exposed to tobacco smoke. She has never used smokeless tobacco.           Breastfeeding No     PHYSICAL EXAM  Physical Exam   Constitutional: She is oriented to person, place, and time. She appears well-developed and well-nourished. She does not have a sickly appearance. She does not appear ill. No distress.   HENT:   Head: Normocephalic and atraumatic.   Right Ear: Hearing and external ear normal.   Left Ear: Hearing and external ear normal.   Eyes: EOM are normal.   Neck: Neck normal appearance.  Pulmonary/Chest: Effort normal.  No respiratory distress.  Neurological: She is alert and oriented to person, place, and time.   Skin: Skin is dry.   Psychiatric: She has a normal mood and affect.           Diagnoses and all orders for this visit:    1. Other infective acute otitis  externa of left ear (Primary)    Other orders  -     neomycin-polymyxin-hydrocortisone (CORTISPORIN) 3.5-64301-5 otic solution; Administer 3 drops into the left ear 4 (Four) Times a Day for 7 days.  Dispense: 10 mL; Refill: 0        The use of a video visit has been reviewed with the patient and verbal informed consent has been obtained. Myself and Johanna Sawyer participated in this visit. The patient is located in [address] [place] [address]. I am located in [place], [place]. Oohlyhart and Twilio were utilized.       Note Disclaimer: At [facility], we believe that sharing information builds trust and better   relationships. You are receiving this note because you recently visited [facility]. It is possible you   will see health information before a provider has talked with you about it. This kind of information can   be easy to misunderstand. To help you fully understand what it means for your health, we urge you to   discuss this note with your provider.    Vijaya White, SANDEE  11/22/2023  10:12 EST

## 2024-05-20 ENCOUNTER — TELEMEDICINE (OUTPATIENT)
Dept: FAMILY MEDICINE CLINIC | Facility: TELEHEALTH | Age: 29
End: 2024-05-20
Payer: COMMERCIAL

## 2024-05-20 DIAGNOSIS — J30.9 ALLERGIC RHINITIS, UNSPECIFIED SEASONALITY, UNSPECIFIED TRIGGER: ICD-10-CM

## 2024-05-20 DIAGNOSIS — H60.501 ACUTE OTITIS EXTERNA OF RIGHT EAR, UNSPECIFIED TYPE: Primary | ICD-10-CM

## 2024-05-20 PROCEDURE — 99213 OFFICE O/P EST LOW 20 MIN: CPT | Performed by: NURSE PRACTITIONER

## 2024-05-20 RX ORDER — CIPROFLOXACIN AND DEXAMETHASONE 3; 1 MG/ML; MG/ML
4 SUSPENSION/ DROPS AURICULAR (OTIC) 2 TIMES DAILY
Qty: 7.5 ML | Refills: 0 | Status: SHIPPED | OUTPATIENT
Start: 2024-05-20 | End: 2024-05-27

## 2024-05-20 NOTE — PROGRESS NOTES
Subjective   Chief Complaint   Patient presents with    Earache       Johanna Sawyer is a 28 y.o. female.     History of Present Illness  Patient reports allergy symptoms with intense bilateral ear itching.  She thinks she accidentally scratched the inside of the right ear canal and it is now swollen and tender.  She has slightly decreased hearing on the right due to the swelling in the canal.  She has had similar symptoms in the past and was treated with antibiotic eardrops.  Earache   There is pain in the right ear. This is a new problem. The current episode started in the past 7 days. The problem occurs constantly. The problem has been worse. There has been no fever. Associated symptoms include hearing loss. Pertinent negatives include no coughing, drainage, headaches, neck pain, rash, rhinorrhea or sore throat. Treatments tried: allergy med.        No Known Allergies    History reviewed. No pertinent past medical history.    Past Surgical History:   Procedure Laterality Date    WISDOM TOOTH EXTRACTION         Social History     Socioeconomic History    Marital status: Single   Tobacco Use    Smoking status: Never     Passive exposure: Never    Smokeless tobacco: Never   Vaping Use    Vaping status: Never Used   Substance and Sexual Activity    Alcohol use: Yes     Comment: social    Drug use: No    Sexual activity: Yes     Partners: Male     Birth control/protection: OCP       Family History   Problem Relation Age of Onset    Hypothyroidism Mother     Obesity Mother         Apple shaped     Diabetes type II Maternal Grandmother          Current Outpatient Medications:     Cetirizine HCl 10 MG capsule, , Disp: , Rfl:     ciprofloxacin-dexAMETHasone (Ciprodex) 0.3-0.1 % otic suspension, Administer 4 drops to the right ear 2 (Two) Times a Day for 7 days., Disp: 7.5 mL, Rfl: 0    Yany Esteban Fe 1-20 MG-MCG(24) per tablet, Take 1 tablet by mouth Daily. To be taken continuously., Disp: 84 tablet, Rfl: 2      Review of  Systems   Constitutional:  Negative for chills, diaphoresis, fatigue and fever.   HENT:  Positive for ear pain and hearing loss. Negative for congestion, hoarse voice, rhinorrhea, sore throat and tinnitus.    Respiratory:  Negative for cough.    Musculoskeletal:  Negative for neck pain.   Skin:  Negative for rash.   Neurological:  Negative for dizziness and headache.   Hematological:  Negative for adenopathy.        There were no vitals filed for this visit.    Objective   Physical Exam  Constitutional:       General: She is not in acute distress.     Appearance: Normal appearance. She is not ill-appearing, toxic-appearing or diaphoretic.   HENT:      Head: Normocephalic.      Right Ear: External ear normal. Decreased hearing noted. Swelling and tenderness present. No drainage.      Left Ear: External ear normal.      Nose: Nose normal.      Mouth/Throat:      Lips: Pink.      Mouth: Mucous membranes are moist.   Pulmonary:      Effort: Pulmonary effort is normal.   Neurological:      Mental Status: She is alert and oriented to person, place, and time.          Procedures     Assessment & Plan   Diagnoses and all orders for this visit:    1. Acute otitis externa of right ear, unspecified type (Primary)  -     ciprofloxacin-dexAMETHasone (Ciprodex) 0.3-0.1 % otic suspension; Administer 4 drops to the right ear 2 (Two) Times a Day for 7 days.  Dispense: 7.5 mL; Refill: 0    2. Allergic rhinitis, unspecified seasonality, unspecified trigger    Continue allergy medicine of choice.   Alternate tylenol and motrin for pain and/or fever, stay hydrated and rest.     If symptoms worsen or do not improve follow up with your PCP or visit your nearest Urgent Care Center or ER.        PLAN: Discussed dosing, side effects, recommended other symptomatic care.  Patient should follow up with primary care provider, Urgent Care or ER if symptoms worsen, fail to resolve or other symptoms need attention. Patient/family agree to the  above.         SANDEE Claros     The use of a video visit has been reviewed with the patient and verbal informed consent has been obtained. Myself and Johanna Sawyer participated in this visit. The patient is located at [address] [place] [address]. I am located in [place], [place]. Humanoidhart and Zoom were utilized.        This visit was performed via Telehealth.  This patient has been instructed to follow-up with their primary care provider if their symptoms worsen or the treatment provided does not resolve their illness.

## 2024-05-20 NOTE — PATIENT INSTRUCTIONS
Continue allergy medicine of choice.   Alternate tylenol and motrin for pain and/or fever, stay hydrated and rest.     If symptoms worsen or do not improve follow up with your PCP or visit your nearest Urgent Care Center or ER.

## 2024-05-23 ENCOUNTER — TELEPHONE (OUTPATIENT)
Dept: OBSTETRICS AND GYNECOLOGY | Facility: CLINIC | Age: 29
End: 2024-05-23
Payer: COMMERCIAL

## 2024-05-23 RX ORDER — NORETHINDRONE ACETATE AND ETHINYL ESTRADIOL, AND FERROUS FUMARATE 1MG-20(24)
1 KIT ORAL DAILY
Qty: 84 TABLET | Refills: 0 | Status: SHIPPED | OUTPATIENT
Start: 2024-05-23

## 2024-05-23 NOTE — TELEPHONE ENCOUNTER
Called and spoke with patient and informed her of short term Rx being called in to hold her over, no further questions.

## 2024-05-23 NOTE — TELEPHONE ENCOUNTER
Caller: Johanna Sawyer    Relationship: Self    Best call back number: [phone]    Requested Prescriptions:   Yany 24 Fe 1-20 MG-MCG(24) per tablet        Pharmacy where request should be sent:      Last office visit with prescribing clinician: Visit date not found   Last telemedicine visit with prescribing clinician: Visit date not found   Next office visit with prescribing clinician: 6/18/2024     Additional details provided by patient:     Does the patient have less than a 3 day supply:  [x] Yes  [] No    Would you like a call back once the refill request has been completed: [x] Yes [] No    If the office needs to give you a call back, can they leave a voicemail: [x] Yes [] No    Adrian Nguyen Rep   05/23/24 12:36 EDT

## 2024-06-17 NOTE — PROGRESS NOTES
Subjective   Chief Complaint   Patient presents with    Gynecologic Exam     Annual and birth control refill.     Johanna Sawyer is a 29 y.o. year old  presenting to be seen for her annual exam. She is doing well and has no complaints. She needs a refill on her birth control today.     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     SEXUAL Hx:  She is currently sexually active.  In the past year there there has been NO new sexual partners.    Condoms are never used.  She would not like to be screened for STD's at today's exam.  Current birth control method: OCP (estrogen/progesterone).  She is happy with her current method of contraception and does not want to discuss alternative methods of contraception.  MENSTRUAL Hx:  Patient's last menstrual period was 2024 (within days).  In the past 6 months her cycles have been regular, predictable and occur ever 3 months.  Her menstrual flow is typically light.   Each month on average there are roughly 0 day(s) of very heavy flow.  Intermenstrual bleeding is present - rarely .    Post-coital bleeding is absent.  Dysmenorrhea: none  PMS: none  Her cycles are not a source of concern for her that she wishes to discuss today.  HEALTH Hx:  She exercises regularly: yes. Walking 3 miles a day.   She wears her seat belt: yes.  She has concerns about domestic violence: no.      The following portions of the patient's history were reviewed and updated as appropriate:problem list, current medications, allergies, past family history, past medical history, past social history, and past surgical history.    Social History    Tobacco Use      Smoking status: Never        Passive exposure: Never      Smokeless tobacco: Never         Objective   /60 (BP Location: Right arm, Patient Position: Sitting, Cuff Size: Adult)   Pulse 92   Wt 62.1 kg (137 lb)   LMP 2024  (Within Days) Comment: continuous birth control  Breastfeeding No   BMI 22.80 kg/m²     General:  well developed; well nourished  no acute distress   Skin:  No suspicious lesions seen   Thyroid: not examined   Breasts:  Examined in supine position  Symmetric without masses or skin dimpling  Nipples normal without inversion, lesions or discharge  There are no palpable axillary nodes   Pelvis: Clinical staff was present for exam  External genitalia:  normal appearance of the external genitalia including Bartholin's and [place]'s glands.  :  urethral meatus normal;  Vaginal:  normal pink mucosa without prolapse or lesions.  Cervix:  normal appearance. friable; ectropian present;  Uterus:  normal size, shape and consistency.  Adnexa:  normal bimanual exam of the adnexa.  Rectal:  digital rectal exam not performed; anus visually normal appearing.        Assessment   Normal GYN exam  Routine pap smear  Contraception surveillance      Plan   Pap was done today.  If she does not receive the results of the Pap within 2 weeks  time, she was instructed to call to find out the results.  I explained to Johanna that the recommendations for Pap smear interval in a low risk patient's has lengthened to 3 years time.  I encouraged her to be seen yearly for a full physical exam including breast and pelvic exam even during the off years when PAP's will not be performed.   Continue OCPs. 90 day supply with 1 year refills sent to listed pharmacy.   The importance of keeping all planned follow-up and taking all medications as prescribed was emphasized.  Follow up for annual exam in 1 year or sooner PRN     New Medications Ordered This Visit   Medications    Yany 24 Fe 1-20 MG-MCG(24) per tablet     Sig: Take 1 tablet by mouth Daily. To be taken continuously.     Dispense:  84 tablet     Refill:  3          This note was electronically signed.    Whitney Calhoun MD   June 18, 2024

## 2024-06-18 ENCOUNTER — OFFICE VISIT (OUTPATIENT)
Dept: OBSTETRICS AND GYNECOLOGY | Facility: CLINIC | Age: 29
End: 2024-06-18
Payer: COMMERCIAL

## 2024-06-18 VITALS
HEART RATE: 92 BPM | BODY MASS INDEX: 22.8 KG/M2 | DIASTOLIC BLOOD PRESSURE: 60 MMHG | SYSTOLIC BLOOD PRESSURE: 100 MMHG | WEIGHT: 137 LBS

## 2024-06-18 DIAGNOSIS — Z12.4 PAPANICOLAOU SMEAR: ICD-10-CM

## 2024-06-18 DIAGNOSIS — Z01.419 WOMEN'S ANNUAL ROUTINE GYNECOLOGICAL EXAMINATION: Primary | ICD-10-CM

## 2024-06-18 DIAGNOSIS — Z30.41 ENCOUNTER FOR SURVEILLANCE OF CONTRACEPTIVE PILLS: ICD-10-CM

## 2024-06-18 PROCEDURE — 99395 PREV VISIT EST AGE 18-39: CPT | Performed by: STUDENT IN AN ORGANIZED HEALTH CARE EDUCATION/TRAINING PROGRAM

## 2024-06-18 PROCEDURE — 99459 PELVIC EXAMINATION: CPT | Performed by: STUDENT IN AN ORGANIZED HEALTH CARE EDUCATION/TRAINING PROGRAM

## 2024-06-18 RX ORDER — NORETHINDRONE ACETATE AND ETHINYL ESTRADIOL, AND FERROUS FUMARATE 1MG-20(24)
1 KIT ORAL DAILY
Qty: 84 TABLET | Refills: 3 | Status: SHIPPED | OUTPATIENT
Start: 2024-06-18

## 2024-06-25 LAB — REF LAB TEST METHOD: NORMAL

## 2025-02-14 ENCOUNTER — TELEPHONE (OUTPATIENT)
Dept: OBSTETRICS AND GYNECOLOGY | Facility: CLINIC | Age: 30
End: 2025-02-14

## 2025-02-14 NOTE — TELEPHONE ENCOUNTER
Caller: Johanna Sawyer  Female, 29 y.o., 1995  MRN: [id number]  CSN: [id number]  Phone: [phone]    Relationship: SELF        What is the best time to reach you: ANY    Who are you requesting to speak with (clinical staff, provider,  specific staff member): SCHEDULING     Do you know the name of the person who called: SCHEDULING     What was the call regarding: APPT, UNABLE TO WARM TRANSFER, PT REQ A CALL BACK, PT STATES APPT ON   6-24-25 WAS TO STAY THE SAME BUT AT [facility] LOCATION,

## 2025-06-03 RX ORDER — NORETHINDRONE ACETATE AND ETHINYL ESTRADIOL, AND FERROUS FUMARATE 1MG-20(24)
KIT ORAL
Qty: 84 TABLET | Refills: 0 | Status: SHIPPED | OUTPATIENT
Start: 2025-06-03

## 2025-06-23 NOTE — PROGRESS NOTES
"Subjective   Chief Complaint   Patient presents with    Gynecologic Exam     Annual.     Johanna Sawyer is a 30 y.o. year old  presenting to be seen for her annual exam. She is doing well, and has no complaints. She is going to [place] next week!     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     SEXUAL Hx:  She is currently sexually active.  In the past year there there has been ONE new sexual partner.    She would like to be screened for STD's at today's exam.  Current birth control method: OCP (estrogen/progesterone).  She is happy with her current method of contraception and does not want to discuss alternative methods of contraception.  MENSTRUAL Hx:  Patient's last menstrual period was 2025 (within days).  In the past 6 months her cycles have been regular, predictable and occur monthly regular, predictable and occur ever 3 months.  Her menstrual flow is typically normal.   Each month on average there are roughly 0 day(s) of very heavy flow.  Intermenstrual bleeding is absent.    Post-coital bleeding is absent.  Dysmenorrhea: none  PMS: none  Her cycles are not a source of concern for her that she wishes to discuss today.  HEALTH Hx:  She exercises regularly: yes. Walking 3-4 miles a day!   She wears her seat belt: yes.  She has concerns about domestic violence: no.    The following portions of the patient's history were reviewed and updated as appropriate:problem list, current medications, allergies, past family history, past medical history, past social history, and past surgical history.    Social History    Tobacco Use      Smoking status: Never        Passive exposure: Never      Smokeless tobacco: Never         Objective   /60 (BP Location: Left arm, Patient Position: Sitting, Cuff Size: Adult)   Ht 165.1 cm (65\") Comment: per pt  Wt 62.8 kg (138 lb 6.4 oz)   " LMP 05/13/2025 (Within Days)   Breastfeeding No   BMI 23.03 kg/m²     General:  well developed; well nourished  no acute distress  mentation appropriate   Breasts:  Examined in supine position  Symmetric without masses or skin dimpling  Nipples normal without inversion, lesions or discharge  There are no palpable axillary nodes  Fibrocystic changes are present both breasts without a discrete mass   Pelvis: Clinical staff was present for exam  External genitalia:  normal appearance of the external genitalia including Bartholin's and [place]'s glands.  :  urethral meatus normal; urethra normal:  Vaginal:  normal pink mucosa without prolapse or lesions. discharge present -  white;  Cervix:  normal appearance. ectropian present;  Uterus:  normal size, shape and consistency.  Adnexa:  normal bimanual exam of the adnexa.  Rectal:  digital rectal exam not performed; anus visually normal appearing.        Assessment   Annual GYN exam   Contraception management   STD screening      Plan   Pap was not done today.  I explained to Johanna that the recommendations for Pap smear interval in a low risk patient has lengthened to 3 years time.  I told Johanna she still needs to be seen in our office yearly for a full physical including breast and pelvic exam.   Continue OCPs. Refill sent to listed pharmacy.  STD swab collected. Will call patient with any abnormal results and treat accordingly.   The importance of keeping all planned follow-up and taking all medications as prescribed was emphasized.  Follow up for annual exam in 1 year or sooner PRN      New Medications Ordered This Visit   Medications    Yany 24 Fe 1-20 MG-MCG(24) per tablet     Sig: Take 1 tablet by mouth Daily.     Dispense:  84 tablet     Refill:  4          This note was electronically signed.    Whitney Calhoun MD   June 24, 2025

## 2025-06-24 ENCOUNTER — OFFICE VISIT (OUTPATIENT)
Age: 30
End: 2025-06-24
Payer: COMMERCIAL

## 2025-06-24 VITALS
DIASTOLIC BLOOD PRESSURE: 60 MMHG | WEIGHT: 138.4 LBS | HEIGHT: 65 IN | SYSTOLIC BLOOD PRESSURE: 104 MMHG | BODY MASS INDEX: 23.06 KG/M2

## 2025-06-24 DIAGNOSIS — Z01.419 WOMEN'S ANNUAL ROUTINE GYNECOLOGICAL EXAMINATION: Primary | ICD-10-CM

## 2025-06-24 DIAGNOSIS — Z11.3 SCREEN FOR STD (SEXUALLY TRANSMITTED DISEASE): ICD-10-CM

## 2025-06-24 DIAGNOSIS — Z30.41 ENCOUNTER FOR SURVEILLANCE OF CONTRACEPTIVE PILLS: ICD-10-CM

## 2025-06-24 RX ORDER — NORETHINDRONE ACETATE AND ETHINYL ESTRADIOL, AND FERROUS FUMARATE 1MG-20(24)
1 KIT ORAL DAILY
Qty: 84 TABLET | Refills: 4 | Status: SHIPPED | OUTPATIENT
Start: 2025-06-24

## 2025-07-17 ENCOUNTER — TELEMEDICINE (OUTPATIENT)
Dept: FAMILY MEDICINE CLINIC | Facility: TELEHEALTH | Age: 30
End: 2025-07-17
Payer: COMMERCIAL

## 2025-07-17 DIAGNOSIS — N39.0 URINARY TRACT INFECTION WITHOUT HEMATURIA, SITE UNSPECIFIED: Primary | ICD-10-CM

## 2025-07-17 RX ORDER — PHENAZOPYRIDINE HYDROCHLORIDE 200 MG/1
200 TABLET, FILM COATED ORAL 3 TIMES DAILY PRN
Qty: 6 TABLET | Refills: 0 | Status: SHIPPED | OUTPATIENT
Start: 2025-07-17 | End: 2025-07-19

## 2025-07-17 RX ORDER — NITROFURANTOIN 25; 75 MG/1; MG/1
100 CAPSULE ORAL 2 TIMES DAILY
Qty: 14 CAPSULE | Refills: 0 | Status: SHIPPED | OUTPATIENT
Start: 2025-07-17 | End: 2025-07-24

## 2025-07-17 NOTE — PROGRESS NOTES
No chief complaint on file.      Video Visit Reason:   Free Text Description: Second day of pain when urintating.  Subjective   Johanna Sawyer is a 30 y.o. female.     History of Present Illness  The patient presents via virtual visit for evaluation of dysuria.    She has been experiencing pain during urination for the past 2 days, accompanied by a sense of urgency. She reports no presence of blood in her urine or any fever. She has not yet taken any over-the-counter medication but is considering using Azo. She has a history of urinary tract infections but they are not recurrent.        The following portions of the patient's history were reviewed and updated as appropriate: allergies, current medications, past medical history, and problem list.      Past Medical History:   Diagnosis Date    Seasonal allergies      Social History     Socioeconomic History    Marital status: Single   Tobacco Use    Smoking status: Never     Passive exposure: Never    Smokeless tobacco: Never   Vaping Use    Vaping status: Never Used   Substance and Sexual Activity    Alcohol use: Yes     Alcohol/week: 6.0 standard drinks of alcohol     Types: 6 Glasses of wine per week    Drug use: No    Sexual activity: Yes     Partners: Male     Birth control/protection: OCP     medication documentation: reviewed and updated with patient and   Current Outpatient Medications:     Cetirizine HCl 10 MG capsule, , Disp: , Rfl:     Yany 24 Fe 1-20 MG-MCG(24) per tablet, Take 1 tablet by mouth Daily., Disp: 84 tablet, Rfl: 4    nitrofurantoin, macrocrystal-monohydrate, (MACROBID) 100 MG capsule, Take 1 capsule by mouth 2 (Two) Times a Day for 7 days., Disp: 14 capsule, Rfl: 0    phenazopyridine (Pyridium) 200 MG tablet, Take 1 tablet by mouth 3 (Three) Times a Day As Needed for Bladder Spasms for up to 2 days., Disp: 6 tablet, Rfl: 0  Review of Systems  See HPI  Objective   Physical Exam  Constitutional:       General: She is not in acute distress.      Appearance: She is not ill-appearing.   Pulmonary:      Effort: Pulmonary effort is normal.   Abdominal:      Tenderness: There is no abdominal tenderness. There is no right CVA tenderness or left CVA tenderness.   Neurological:      Mental Status: She is alert.         Assessment & Plan   Diagnoses and all orders for this visit:    1. Urinary tract infection without hematuria, site unspecified (Primary)  -     nitrofurantoin, macrocrystal-monohydrate, (MACROBID) 100 MG capsule; Take 1 capsule by mouth 2 (Two) Times a Day for 7 days.  Dispense: 14 capsule; Refill: 0  -     phenazopyridine (Pyridium) 200 MG tablet; Take 1 tablet by mouth 3 (Three) Times a Day As Needed for Bladder Spasms for up to 2 days.  Dispense: 6 tablet; Refill: 0                  Follow Up:  If your symptoms are not resolving by the completion of your treatment or are worsening, see your primary care provider for follow up. If you don't have a primary care provider, you may go to any Urgent Care for re-evaluation. If you develop any life threatening symptoms, go to the nearest Emergency Department immediately or call EMS.       Patient or patient representative verbalized consent for the use of Ambient Listening during the visit with  SANDEE Nash for chart documentation. 7/17/2025  08:25 EDT        The use of  Video Visit was utilized during this visit, using both [employer] and Cloud[employer]/Epic. The use of a video visit has been reviewed with the patient and verbal informed consent has been obtained. No technical difficulties occurred during the visit.    is located at [address] [place] [address]  Provider is located at [place], [place]

## 2025-07-18 ENCOUNTER — TELEPHONE (OUTPATIENT)
Dept: OBSTETRICS AND GYNECOLOGY | Facility: CLINIC | Age: 30
End: 2025-07-18
Payer: COMMERCIAL

## 2025-07-18 NOTE — TELEPHONE ENCOUNTER
Patient just started taking an antibiotic and wondered if it would interfere with her birth control. Please advise

## 2025-07-21 NOTE — TELEPHONE ENCOUNTER
I spoke to Johanna and gave her Dr Calhoun's response about Macrobid  and condom use as back BC. Pt verbally understood.

## 2025-07-21 NOTE — TELEPHONE ENCOUNTER
If it is the Macrobid listed in her chart, as far as I am aware, it should not interfere with her birth control. However, as a precaution, recommend back up birth control with condoms until she completes the entire course of the antibiotic.     Whitney Calhoun MD

## 2025-07-29 ENCOUNTER — TELEMEDICINE (OUTPATIENT)
Dept: FAMILY MEDICINE CLINIC | Facility: TELEHEALTH | Age: 30
End: 2025-07-29
Payer: COMMERCIAL

## 2025-07-29 DIAGNOSIS — R30.0 DYSURIA: Primary | ICD-10-CM

## 2025-07-29 PROCEDURE — 99213 OFFICE O/P EST LOW 20 MIN: CPT | Performed by: NURSE PRACTITIONER

## 2025-07-29 RX ORDER — SULFAMETHOXAZOLE AND TRIMETHOPRIM 800; 160 MG/1; MG/1
1 TABLET ORAL 2 TIMES DAILY
Qty: 10 TABLET | Refills: 0 | Status: SHIPPED | OUTPATIENT
Start: 2025-07-29 | End: 2025-08-03

## 2025-07-29 NOTE — PROGRESS NOTES
HPI  Johanna Sawyer is a 30 y.o. female  presents with complaint of returning UTI symptoms. She completed macrobid which resolved symptoms until yesterday. She reports she is now having dysuria, urgency and lower abd pain. Denies fever, chills, sweats. She has taken pyridium which does improve symptoms. She has had UTI's in the past but has never had one return so soon.     Review of Systems    Past Medical History:   Diagnosis Date    Seasonal allergies        Family History   Problem Relation Age of Onset    Hypothyroidism Mother     Obesity Mother         Apple shaped     Diabetes type II Maternal Grandmother        Social History     Socioeconomic History    Marital status: Single   Tobacco Use    Smoking status: Never     Passive exposure: Never    Smokeless tobacco: Never   Vaping Use    Vaping status: Never Used   Substance and Sexual Activity    Alcohol use: Yes     Alcohol/week: 6.0 standard drinks of alcohol     Types: 6 Glasses of wine per week    Drug use: No    Sexual activity: Yes     Partners: Male     Birth control/protection: OCP         There were no vitals taken for this visit.    PHYSICAL EXAM  Physical Exam   Constitutional: She appears well-developed and well-nourished.   HENT:   Head: Normocephalic.   Nose: Nose normal.   Neck: Neck normal appearance.  Pulmonary/Chest: Effort normal.   Neurological: She is alert.   Psychiatric: She has a normal mood and affect. Her speech is normal.       Diagnoses and all orders for this visit:    1. Dysuria (Primary)  -     Urine Culture - Urine, Urine, Clean Catch; Future  -     sulfamethoxazole-trimethoprim (Bactrim DS) 800-160 MG per tablet; Take 1 tablet by mouth 2 (Two) Times a Day for 5 days.  Dispense: 10 tablet; Refill: 0  -     Urinalysis With Microscopic - Urine, Clean Catch; Future    Submit urine test then start bactrim. We will call when urine culture results become available. Pt verbalized understanding.       FOLLOW-UP  As discussed during  visit with [facility], if symptoms worsen or fail to improve, follow-up with PCP/Urgent Care/Emergency Department.    Patient verbalizes understanding of medications, instructions for treatment and follow-up.    Brittani De La Paz, APRN  07/29/2025  17:34 EDT      Mode of Visit: Video  Location of patient: -HOME-  Location of provider: Home  You have chosen to receive care through a telehealth visit.  The patient has signed the video visit consent form.  The visit included audio and video interaction. No technical issues occurred during this visit.

## 2025-07-30 ENCOUNTER — LAB (OUTPATIENT)
Dept: LAB | Facility: HOSPITAL | Age: 30
End: 2025-07-30
Payer: COMMERCIAL

## 2025-07-30 DIAGNOSIS — R30.0 DYSURIA: ICD-10-CM

## 2025-07-30 LAB
BACTERIA UR QL AUTO: ABNORMAL /HPF
BILIRUB UR QL STRIP: NEGATIVE
CLARITY UR: CLEAR
COLOR UR: ABNORMAL
GLUCOSE UR STRIP-MCNC: NEGATIVE MG/DL
HGB UR QL STRIP.AUTO: NEGATIVE
HYALINE CASTS UR QL AUTO: ABNORMAL /LPF
KETONES UR QL STRIP: NEGATIVE
LEUKOCYTE ESTERASE UR QL STRIP.AUTO: ABNORMAL
NITRITE UR QL STRIP: NEGATIVE
PH UR STRIP.AUTO: 7.5 [PH] (ref 5–8)
PROT UR QL STRIP: NEGATIVE
RBC # UR STRIP: ABNORMAL /HPF
REF LAB TEST METHOD: ABNORMAL
SP GR UR STRIP: 1.01 (ref 1–1.03)
SQUAMOUS #/AREA URNS HPF: ABNORMAL /HPF
UROBILINOGEN UR QL STRIP: ABNORMAL
WBC # UR STRIP: ABNORMAL /HPF

## 2025-07-30 PROCEDURE — 87086 URINE CULTURE/COLONY COUNT: CPT

## 2025-07-30 PROCEDURE — 81001 URINALYSIS AUTO W/SCOPE: CPT

## 2025-07-31 LAB — BACTERIA SPEC AEROBE CULT: NORMAL
